# Patient Record
Sex: MALE | Race: WHITE | NOT HISPANIC OR LATINO | Employment: OTHER | URBAN - METROPOLITAN AREA
[De-identification: names, ages, dates, MRNs, and addresses within clinical notes are randomized per-mention and may not be internally consistent; named-entity substitution may affect disease eponyms.]

---

## 2024-11-15 ENCOUNTER — OFFICE VISIT (OUTPATIENT)
Dept: FAMILY MEDICINE | Facility: CLINIC | Age: 44
End: 2024-11-15
Payer: COMMERCIAL

## 2024-11-15 VITALS
DIASTOLIC BLOOD PRESSURE: 120 MMHG | HEART RATE: 74 BPM | TEMPERATURE: 98 F | WEIGHT: 204.69 LBS | BODY MASS INDEX: 32.13 KG/M2 | HEIGHT: 67 IN | SYSTOLIC BLOOD PRESSURE: 160 MMHG | OXYGEN SATURATION: 99 %

## 2024-11-15 DIAGNOSIS — R03.0 ELEVATED BLOOD PRESSURE READING: ICD-10-CM

## 2024-11-15 DIAGNOSIS — Z11.59 NEED FOR HEPATITIS C SCREENING TEST: ICD-10-CM

## 2024-11-15 DIAGNOSIS — Z00.00 NORMAL PHYSICAL EXAM: Primary | ICD-10-CM

## 2024-11-15 DIAGNOSIS — R53.83 FATIGUE, UNSPECIFIED TYPE: ICD-10-CM

## 2024-11-15 DIAGNOSIS — Z11.4 ENCOUNTER FOR SCREENING FOR HIV: ICD-10-CM

## 2024-11-15 DIAGNOSIS — R06.83 SNORING: ICD-10-CM

## 2024-11-15 PROCEDURE — 99999 PR PBB SHADOW E&M-NEW PATIENT-LVL IV: CPT | Mod: PBBFAC,,, | Performed by: INTERNAL MEDICINE

## 2024-11-15 NOTE — PROGRESS NOTES
This note was created by combination of typed  and M-Modal dictation.  Transcription errors may be present.   This note was also generated with the assistance of ambient listening technology. Verbal consent was obtained by the patient and accompanying visitor(s) for the recording of patient appointment to facilitate this note. I attest to having reviewed and edited the generated note for accuracy, though some syntax or spelling errors may persist. Please contact the author of this note for any clarification.    Assessment and Plan:   Assessment and Plan   Normal physical exam  Need for hepatitis C screening test  Encounter for screening for HIV  -return for future fasting labs   Routine HIV, hep C screening   Needs to work on cutting down salt intake   Physical activity with work  Colon cancer screening age 46  Declines vaccinations today  -     HIV 1/2 Ag/Ab (4th Gen); Future; Expected date: 11/15/2024  -     Hepatitis C Antibody; Future; Expected date: 11/15/2024  -     CBC Auto Differential; Future; Expected date: 11/15/2024  -     Comprehensive Metabolic Panel; Future; Expected date: 11/15/2024  -     Lipid Panel; Future; Expected date: 11/15/2024  -     Hemoglobin A1C; Future; Expected date: 11/15/2024  -     TSH; Future; Expected date: 11/15/2024  -     CBC Without Differential; Future; Expected date: 11/14/2025  -     Comprehensive Metabolic Panel; Future; Expected date: 11/14/2025  -     Lipid Panel; Future; Expected date: 11/14/2025  -     Hemoglobin A1C; Future; Expected date: 11/14/2025    Fatigue, unspecified type  Snoring  -reports heroic snoring, possible witnessed apnea.  He is interested in sleep study.  HST ordered.  If abnormal, would start with nasal pillow.  If normal would need in-lab study  -     Home Sleep Study; Future    Elevated blood pressure reading  -BP high on intake remains high on repeat.  Reports previously with [facility] primary care had an initial elevated BP but  reportedly normalized when he came in for BP check a few weeks later.  Has a home cuff but does not check regularly   I have asked him to start monitoring at home, discussed proper measurement techniques.  Start cutting down on salt intake.  Return 2 weeks for nurse visit for BP check.  If still significant elevated may consider initiating medication.      Assessment & Plan    PLAN SUMMARY:  - Home sleep test ordered  - Blood work ordered for follow-up visit: CBC, LFTs, kidney function, fasting glucose, thyroid function, HbA1c, lipid panel, HIV and Hepatitis C screening  - Follow-up in 2 weeks on Monday morning for blood pressure check with nurse and fasting labs  - Referred to sleep department for home sleep test setup  - Increase water consumption, minimize diet soda intake  - Reduce salt intake  - Contact sleep lab if not heard from within a week regarding sleep test setup  - Review labs results via [employer] patient portal    SLEEP APNEA:  - Assessed patient for potential sleep apnea based on reported symptoms of tiredness, restlessness, and snoring.  - Considered home sleep test as initial diagnostic approach, with in-lab study if inconclusive.  - Explained home sleep test procedure and potential need for in-lab study if inconclusive.  - Discussed CPAP mask options, including nasal pillow and full face mask.  - Home sleep test ordered.  - Referred to sleep department for home sleep test setup.  - Contact sleep lab if not heard from within a week regarding sleep test setup.    ELEVATED BLOOD PRESSURE:  - Evaluated blood pressure, noting elevated reading of 160/120; not yet diagnosing hypertension, requires 3 separate elevated readings.  - Planned to monitor blood pressure over next 2 weeks before considering medication.  - Considered impact of potential sleep apnea on blood pressure.  - Educated on criteria for hypertension diagnosis: 3 separate readings with top number above 140 and/or bottom number above 90.  -  Informed about relationship between sleep apnea and blood pressure.  - Castillo to reduce salt intake.  - Castillo to increase water consumption, minimize diet soda intake.  - Castillo to check blood pressure at home daily or every few days after sitting calmly for 5 minutes.  - Castillo to log blood pressure readings.    CANCER SCREENING:  - Assessed patient's risk for colon cancer screening, determining average risk with screening to begin at age 45.  - Discussed prostate cancer screening, noting patient is currently too young but will address in future.  - Explained colon cancer screening recommendations, including stool test at 45 and colonoscopy at 50.    GENERAL HEALTH EXAMINATION:  - Blood work ordered (to be done at follow-up visit): Complete blood count, Liver function tests, Kidney function tests, Blood sugar (fasting), Thyroid function, HbA1c, Lipid panel (fasting), HIV screening, Hepatitis C screening.    FOLLOW-UP:  - Follow up in 2 weeks on a Monday morning for blood pressure check with nurse and fasting labs.  - Review labs results via [employer] patient portal.         Medications Discontinued During This Encounter   Medication Reason    ondansetron (ZOFRAN-ODT) 8 MG TbDL Therapy completed    oxycodone-acetaminophen (PERCOCET)  mg per tablet Therapy completed       meds sent this encounter:         Follow Up:  Physical exam 1 year with labs.  Return for nurse visit for BP check.  If reinitiate BP medication would have him come back in about 3 months on new medication  Future Appointments   Date Time Provider Department Center   11/22/2024  3:00 PM [facility] [place]          Subjective:   Subjective   Chief Complaint   Patient presents with    [facility]    Annual Exam    Referral       HPI  Castillo is a 44 y.o. male.     Social History     Tobacco Use    Smoking status: Former     Types: Cigarettes     Passive exposure: Never    Smokeless tobacco: Never   Substance Use Topics    Alcohol  use: Not Currently          Social History     Social History Narrative    Not on file       Patient Care Team:  No, Primary Doctor as PCP - General    Last appointment with this clinic was Visit date not found. Last visit with me Visit date not found   To summarize last visit and events leading up to today:  New to [facility]    Today's visit:    History of Present Illness    SOCIAL HISTORY:  - Consumes diet sodas  - Occupation: Lawn care business owner, long-term career in the field    HPI:  Castillo reports sleep-related issues as his primary concern, including persistent fatigue, restlessness, and poor quality sleep at night. He reports significant snoring, and his wife has observed episodes of apnea during sleep, suggesting possible sleep apnea. These symptoms have been ongoing for an extended period.    Castillo reports occasional headaches and sinus problems related to his work in lawn care. He has severe sneezing episodes upon waking, sometimes sneezing 12-15 times consecutively. His allergy symptoms worsen during spring when pollen counts are high, and recently during a period of drought-induced dust. He takes OTC medications like Allegra and Claritin, and attempts to wear a mask while working outside to manage these symptoms.    Castillo's blood pressure was found to be elevated today, with a reading of 160/120. During his last doctor's visit years ago, his blood pressure was initially high but normalized upon retesting a week later. Castillo acknowledges a high salt intake in his diet.    Castillo denies chest pain, shortness of breath, blood in stool, heartburn, diarrhea, and constipation. He denies having any known health conditions, cardiac issues, blood sugar problems, or asthma.    MEDICATIONS:  - Allegra or Claritin, OTC, as needed for seasonal allergies    MEDICAL HISTORY:  - Seasonal allergies    FAMILY HISTORY:  - Mother: Diabetes  - Father: Heart problems, hypertension,  "hypercholesterolemia      ROS:  General: reports fatigue  Cardiovascular: no chest pain  Respiratory: no shortness of breath  Gastrointestinal: no diarrhea, no constipation, no heartburn  Neurological: reports headache  Psychiatric: reports sleep difficulty  Allergic: reports seasonal allergies         ALLERGIES AND MEDICATIONS: updated and reviewed.  Medication List with Changes/Refills   Current Medications    ONDANSETRON (ZOFRAN-ODT) 8 MG TBDL    Take 1 tablet (8 mg total) by mouth every 8 (eight) hours as needed.    OXYCODONE-ACETAMINOPHEN (PERCOCET)  MG PER TABLET    Take 1 tablet by mouth every 4 (four) hours as needed for Pain.         Objective:   Objective   Physical Exam   Vitals:    11/15/24 1459   BP: (!) 160/120   Pulse: 74   Temp: 97.5 °F (36.4 °C)   TempSrc: Oral   SpO2: 99%   Weight: 92.8 kg (204 lb 11.2 oz)   Height: 5' 7" (1.702 m)    Body mass index is 32.06 kg/m².  Weight: 92.8 kg (204 lb 11.2 oz)   Height: 5' 7" (170.2 cm)     Physical Exam  Constitutional:       Appearance: Normal appearance. He is well-developed.   HENT:      Right Ear: Tympanic membrane and external ear normal.      Left Ear: Tympanic membrane and external ear normal.      Nose: Nose normal.   Eyes:      General: No scleral icterus.     Conjunctiva/sclera: Conjunctivae normal.   Neck:      Thyroid: No thyroid mass or thyromegaly.      Trachea: Trachea normal.   Cardiovascular:      Rate and Rhythm: Normal rate and regular rhythm.      Heart sounds: Normal heart sounds, S1 normal and S2 normal. No murmur heard.  Pulmonary:      Effort: Pulmonary effort is normal.      Breath sounds: Normal breath sounds.   Abdominal:      General: There is no distension.      Palpations: Abdomen is soft. There is no hepatomegaly, splenomegaly or mass.      Tenderness: There is no abdominal tenderness.   Musculoskeletal:         General: No deformity.      Right lower leg: No edema.      Left lower leg: No edema.   Lymphadenopathy:      " Cervical: No cervical adenopathy.   Skin:     General: Skin is warm and dry.      Findings: No rash (on exposed skin).      Comments: On exposed skin   Neurological:      Mental Status: He is alert and oriented to person, place, and time.      Cranial Nerves: No cranial nerve deficit.      Sensory: No sensory deficit.      Deep Tendon Reflexes: Reflexes are normal and symmetric.   Psychiatric:         Speech: Speech normal.         Behavior: Behavior normal.         Thought Content: Thought content normal.         Judgment: Judgment normal.

## 2024-11-17 ENCOUNTER — PATIENT MESSAGE (OUTPATIENT)
Dept: FAMILY MEDICINE | Facility: CLINIC | Age: 44
End: 2024-11-17
Payer: COMMERCIAL

## 2024-11-18 NOTE — PROGRESS NOTES
This note was created by combination of typed  and M-Modal dictation.  Transcription errors may be present.   This note was also generated with the assistance of ambient listening technology. Verbal consent was obtained by the patient and accompanying visitor(s) for the recording of patient appointment to facilitate this note. I attest to having reviewed and edited the generated note for accuracy, though some syntax or spelling errors may persist. Please contact the author of this note for any clarification.    Assessment and Plan:   Assessment and Plan   Essential hypertension  -reports serial home readings persistently above 140/90.    No baseline labs yet  Start amlodipine mid-range dose.  SE profile discussed  Upcoming nurse visit for BP check and check labs  Anticipate will need additional medication - plan for irbesartan hctz  Ordered HST at  as well.  -     amLODIPine (NORVASC) 5 MG tablet; Take 1 tablet (5 mg total) by mouth once daily.  Dispense: 30 tablet; Refill: 5      There are no discontinued medications.    meds sent this encounter:  Medications Ordered This Encounter   Medications    amLODIPine (NORVASC) 5 MG tablet     Sig: Take 1 tablet (5 mg total) by mouth once daily.     Dispense:  30 tablet     Refill:  5     .         Follow Up: nurse visit for BP check and labs. Anticipate will need follow up in about 3 months on subsequent meds  Future Appointments   Date Time Provider Department Center   11/19/2024  1:00 PM Dexter Farris MD [facility] MED [place]   12/3/2024  8:30 AM NURSE, [facility] [place]   12/3/2024 10:15 AM LAB, [facility] [place]          Subjective:   Subjective   Chief Complaint   Patient presents with    Hypertension       HPI  Castillo is a 44 y.o. male.     Social History     Tobacco Use    Smoking status: Former     Types: Cigarettes     Passive exposure: Never    Smokeless tobacco: Never   Substance Use Topics    Alcohol use: Never       Social History     Occupational History    Occupation: yard and lawn care, residential and commercial      Social History     Social History Narrative    , wife, daughter       Patient Care Team:  Dexter Farris MD as PCP - General (Internal Medicine)  The chief complaint leading to consultation is: HTN  Visit type: Virtual visit with synchronous audio and video  Total time spent with patient: 15 minutes  Each patient to whom he or she provides medical services by telemedicine is:  (1) informed of the relationship between the physician and patient and the respective role of any other health care provider with respect to management of the patient; and (2) notified that he or she may decline to receive medical services by telemedicine and may withdraw from such care at any time.    Notes:  Last appointment with this clinic was 11/15/2024. Last visit with me 11/15/2024   To summarize last visit and events leading up to today:  Last visit with me 11/15/2024 to establish care  Elevated BP.  Home cuff not checking regularly.  I have asked him to start monitoring.  Return 2 weeks for BP check and if still high initiate medication.  Watch out for salt   Fatigue and snoring ordered HST    Labs ordered to be done when he returns for BP check    Today's visit:    History of Present Illness    HPI:  Castillo reports consistently elevated blood pressure readings since his last visit on Friday. He has been monitoring his blood pressure at home using an upper arm cuff. The lowest reading obtained was 158/115 or 150/115, with fluctuations ranging from 160s to 170s over 115 to 120. This morning, the patient recorded a blood pressure of 178/116. Castillo is anxious about these consistently high readings. He has been adhering to a low-sodium diet since Friday as previously discussed with me. Castillo researched symptoms online and became concerned when he read that blood pressure over 180 warrants emergency medical attention. He has  been implementing dietary changes as discussed in his previous visit.    Castillo denies chest pain, shortness of breath, or headache.    ROS:  Cardiovascular: no chest pain  Respiratory: no shortness of breath  Neurological: no headache  Psychiatric: reports anxiety         Answers submitted by the patient for this visit:  High Blood Pressure Questionnaire (Submitted on 11/18/2024)  Chief Complaint: Hypertension  Chronicity: new  Onset: in the past 7 days  Progression since onset: unchanged  Condition status: uncontrolled  anxiety: No  blurred vision: No  chest pain: No  headaches: No  malaise/fatigue: Yes  neck pain: No  orthopnea: No  palpitations: No  peripheral edema: No  PND: No  shortness of breath: No  sweats: No  Agents associated with hypertension: no associated agents  CAD risks: family history  Compliance problems: no compliance problems  Past treatments: nothing      ALLERGIES AND MEDICATIONS: updated and reviewed.        Objective:   Objective   Physical Exam   Vitals:    11/19/24 1310   BP: (!) 178/116   BP Location: Left arm   Patient Position: Sitting    There is no height or weight on file to calculate BMI.            Physical Exam  Constitutional:       General: He is not in acute distress.     Appearance: Normal appearance. He is not ill-appearing.   HENT:      Head: Normocephalic.   Pulmonary:      Effort: Pulmonary effort is normal.   Neurological:      General: No focal deficit present.      Mental Status: He is alert.   Psychiatric:         Mood and Affect: Mood normal.         Behavior: Behavior normal.         Thought Content: Thought content normal.         Judgment: Judgment normal.

## 2024-11-19 ENCOUNTER — OFFICE VISIT (OUTPATIENT)
Dept: FAMILY MEDICINE | Facility: CLINIC | Age: 44
End: 2024-11-19
Payer: COMMERCIAL

## 2024-11-19 VITALS — SYSTOLIC BLOOD PRESSURE: 178 MMHG | DIASTOLIC BLOOD PRESSURE: 116 MMHG

## 2024-11-19 DIAGNOSIS — I10 ESSENTIAL HYPERTENSION: Primary | ICD-10-CM

## 2024-11-19 PROCEDURE — 3080F DIAST BP >= 90 MM HG: CPT | Mod: CPTII,95,, | Performed by: INTERNAL MEDICINE

## 2024-11-19 PROCEDURE — 1160F RVW MEDS BY RX/DR IN RCRD: CPT | Mod: CPTII,95,, | Performed by: INTERNAL MEDICINE

## 2024-11-19 PROCEDURE — 99213 OFFICE O/P EST LOW 20 MIN: CPT | Mod: 95,,, | Performed by: INTERNAL MEDICINE

## 2024-11-19 PROCEDURE — 1159F MED LIST DOCD IN RCRD: CPT | Mod: CPTII,95,, | Performed by: INTERNAL MEDICINE

## 2024-11-19 PROCEDURE — 3077F SYST BP >= 140 MM HG: CPT | Mod: CPTII,95,, | Performed by: INTERNAL MEDICINE

## 2024-11-19 RX ORDER — AMLODIPINE BESYLATE 5 MG/1
5 TABLET ORAL DAILY
Qty: 30 TABLET | Refills: 5 | Status: SHIPPED | OUTPATIENT
Start: 2024-11-19 | End: 2025-11-19

## 2024-11-20 ENCOUNTER — PATIENT MESSAGE (OUTPATIENT)
Dept: FAMILY MEDICINE | Facility: CLINIC | Age: 44
End: 2024-11-20
Payer: COMMERCIAL

## 2024-11-21 ENCOUNTER — TELEPHONE (OUTPATIENT)
Dept: FAMILY MEDICINE | Facility: CLINIC | Age: 44
End: 2024-11-21
Payer: COMMERCIAL

## 2024-11-21 NOTE — TELEPHONE ENCOUNTER
"----- Message from Jesus sent at 11/21/2024  1:53 PM CST -----  Regarding: Wife  Type:Patient Callback     Who called: Isi "wife"     What is the request in detail: Patient received an estimate for a sleep study. The number that is on the estimate is [phone] for the [facility] location and there is no name on the estimate as well. Please reach out to the patient as soon as possible.     Can the clinic reply by [facility]? Yes     Would the patient rather a call back or a response via [facility]? Callback     Best call back number: [phone]    Additional Information:  "

## 2024-11-21 NOTE — TELEPHONE ENCOUNTER
Spoke with patient. Patient was advised to contact the sleep department to get more clarity on how home sleep studies are done. Patient verbalized understanding and does not have any other questions or concerns at this time.

## 2024-11-25 ENCOUNTER — TELEPHONE (OUTPATIENT)
Dept: PULMONOLOGY | Facility: CLINIC | Age: 44
End: 2024-11-25
Payer: COMMERCIAL

## 2024-12-03 ENCOUNTER — CLINICAL SUPPORT (OUTPATIENT)
Dept: FAMILY MEDICINE | Facility: CLINIC | Age: 44
End: 2024-12-03
Payer: COMMERCIAL

## 2024-12-03 ENCOUNTER — LAB VISIT (OUTPATIENT)
Dept: LAB | Facility: HOSPITAL | Age: 44
End: 2024-12-03
Attending: INTERNAL MEDICINE
Payer: COMMERCIAL

## 2024-12-03 ENCOUNTER — TELEPHONE (OUTPATIENT)
Dept: SLEEP MEDICINE | Facility: HOSPITAL | Age: 44
End: 2024-12-03
Payer: COMMERCIAL

## 2024-12-03 ENCOUNTER — PATIENT OUTREACH (OUTPATIENT)
Dept: ADMINISTRATIVE | Facility: HOSPITAL | Age: 44
End: 2024-12-03
Payer: COMMERCIAL

## 2024-12-03 VITALS — HEART RATE: 79 BPM | DIASTOLIC BLOOD PRESSURE: 80 MMHG | SYSTOLIC BLOOD PRESSURE: 132 MMHG | OXYGEN SATURATION: 98 %

## 2024-12-03 DIAGNOSIS — Z11.59 NEED FOR HEPATITIS C SCREENING TEST: ICD-10-CM

## 2024-12-03 DIAGNOSIS — Z00.00 NORMAL PHYSICAL EXAM: ICD-10-CM

## 2024-12-03 DIAGNOSIS — Z11.4 ENCOUNTER FOR SCREENING FOR HIV: ICD-10-CM

## 2024-12-03 DIAGNOSIS — I10 ESSENTIAL HYPERTENSION: Primary | ICD-10-CM

## 2024-12-03 LAB
ALBUMIN SERPL BCP-MCNC: 4.3 G/DL (ref 3.5–5.2)
ALP SERPL-CCNC: 64 U/L (ref 40–150)
ALT SERPL W/O P-5'-P-CCNC: 20 U/L (ref 10–44)
ANION GAP SERPL CALC-SCNC: 10 MMOL/L (ref 8–16)
AST SERPL-CCNC: 18 U/L (ref 10–40)
BASOPHILS # BLD AUTO: 0.06 K/UL (ref 0–0.2)
BASOPHILS NFR BLD: 1 % (ref 0–1.9)
BILIRUB SERPL-MCNC: 0.7 MG/DL (ref 0.1–1)
BUN SERPL-MCNC: 15 MG/DL (ref 6–20)
CALCIUM SERPL-MCNC: 8.9 MG/DL (ref 8.7–10.5)
CHLORIDE SERPL-SCNC: 105 MMOL/L (ref 95–110)
CHOLEST SERPL-MCNC: 190 MG/DL (ref 120–199)
CHOLEST/HDLC SERPL: 4.2 {RATIO} (ref 2–5)
CO2 SERPL-SCNC: 25 MMOL/L (ref 23–29)
CREAT SERPL-MCNC: 1.2 MG/DL (ref 0.5–1.4)
DIFFERENTIAL METHOD BLD: NORMAL
EOSINOPHIL # BLD AUTO: 0.3 K/UL (ref 0–0.5)
EOSINOPHIL NFR BLD: 4 % (ref 0–8)
ERYTHROCYTE [DISTWIDTH] IN BLOOD BY AUTOMATED COUNT: 12.2 % (ref 11.5–14.5)
EST. GFR  (NO RACE VARIABLE): >60 ML/MIN/1.73 M^2
ESTIMATED AVG GLUCOSE: 108 MG/DL (ref 68–131)
GLUCOSE SERPL-MCNC: 81 MG/DL (ref 70–110)
HBA1C MFR BLD: 5.4 % (ref 4–5.6)
HCT VFR BLD AUTO: 46.4 % (ref 40–54)
HCV AB SERPL QL IA: NORMAL
HDLC SERPL-MCNC: 45 MG/DL (ref 40–75)
HDLC SERPL: 23.7 % (ref 20–50)
HGB BLD-MCNC: 15.2 G/DL (ref 14–18)
HIV 1+2 AB+HIV1 P24 AG SERPL QL IA: NORMAL
IMM GRANULOCYTES # BLD AUTO: 0.02 K/UL (ref 0–0.04)
IMM GRANULOCYTES NFR BLD AUTO: 0.3 % (ref 0–0.5)
LDLC SERPL CALC-MCNC: 125.6 MG/DL (ref 63–159)
LYMPHOCYTES # BLD AUTO: 1.8 K/UL (ref 1–4.8)
LYMPHOCYTES NFR BLD: 28.6 % (ref 18–48)
MCH RBC QN AUTO: 30.7 PG (ref 27–31)
MCHC RBC AUTO-ENTMCNC: 32.8 G/DL (ref 32–36)
MCV RBC AUTO: 94 FL (ref 82–98)
MONOCYTES # BLD AUTO: 0.6 K/UL (ref 0.3–1)
MONOCYTES NFR BLD: 10 % (ref 4–15)
NEUTROPHILS # BLD AUTO: 3.5 K/UL (ref 1.8–7.7)
NEUTROPHILS NFR BLD: 56.1 % (ref 38–73)
NONHDLC SERPL-MCNC: 145 MG/DL
NRBC BLD-RTO: 0 /100 WBC
PLATELET # BLD AUTO: 276 K/UL (ref 150–450)
PMV BLD AUTO: 11.2 FL (ref 9.2–12.9)
POTASSIUM SERPL-SCNC: 4.3 MMOL/L (ref 3.5–5.1)
PROT SERPL-MCNC: 7.4 G/DL (ref 6–8.4)
RBC # BLD AUTO: 4.95 M/UL (ref 4.6–6.2)
SODIUM SERPL-SCNC: 140 MMOL/L (ref 136–145)
TRIGL SERPL-MCNC: 97 MG/DL (ref 30–150)
TSH SERPL DL<=0.005 MIU/L-ACNC: 1.95 UIU/ML (ref 0.4–4)
WBC # BLD AUTO: 6.3 K/UL (ref 3.9–12.7)

## 2024-12-03 PROCEDURE — 99999 PR PBB SHADOW E&M-EST. PATIENT-LVL II: CPT | Mod: PBBFAC,,,

## 2024-12-03 PROCEDURE — 85025 COMPLETE CBC W/AUTO DIFF WBC: CPT | Performed by: INTERNAL MEDICINE

## 2024-12-03 PROCEDURE — 80061 LIPID PANEL: CPT | Performed by: INTERNAL MEDICINE

## 2024-12-03 PROCEDURE — 87389 HIV-1 AG W/HIV-1&-2 AB AG IA: CPT | Performed by: INTERNAL MEDICINE

## 2024-12-03 PROCEDURE — 80053 COMPREHEN METABOLIC PANEL: CPT | Performed by: INTERNAL MEDICINE

## 2024-12-03 PROCEDURE — 86803 HEPATITIS C AB TEST: CPT | Performed by: INTERNAL MEDICINE

## 2024-12-03 PROCEDURE — 83036 HEMOGLOBIN GLYCOSYLATED A1C: CPT | Performed by: INTERNAL MEDICINE

## 2024-12-03 PROCEDURE — 36415 COLL VENOUS BLD VENIPUNCTURE: CPT | Mod: PO | Performed by: INTERNAL MEDICINE

## 2024-12-03 PROCEDURE — 84443 ASSAY THYROID STIM HORMONE: CPT | Performed by: INTERNAL MEDICINE

## 2024-12-03 NOTE — PROGRESS NOTES
"Castillo Domínguez 44 y.o. male is here today for Blood Pressure check.   History of HTN yes.    Review of patient's allergies indicates:  No Known Allergies  No results found for: "CREATININE", "NA", "K"]  Patient verifies taking blood pressure medications on a regular basis at the same time of the day.     Current Outpatient Medications:     amLODIPine (NORVASC) 5 MG tablet, Take 1 tablet (5 mg total) by mouth once daily., Disp: 30 tablet, Rfl: 5  Does patient have record of home blood pressure readings no.   Last dose of blood pressure medication was taken at 7 am 12/2/24.  Patient is asymptomatic.       BP: 132/80 , Pulse: 79 .        "
Name band;

## 2024-12-04 NOTE — PROGRESS NOTES
Screening HIV, hep C negative   TSH normal   Lipid normal   CMP normal  A1c 5.4 normal   CBC normal    BP significantly improved had nurse visit 12/03/2024.  On amlodipine  Results to pt.  Plan for OV 6 months.

## 2024-12-13 ENCOUNTER — PATIENT MESSAGE (OUTPATIENT)
Dept: FAMILY MEDICINE | Facility: CLINIC | Age: 44
End: 2024-12-13
Payer: COMMERCIAL

## 2024-12-29 ENCOUNTER — PATIENT MESSAGE (OUTPATIENT)
Dept: FAMILY MEDICINE | Facility: CLINIC | Age: 44
End: 2024-12-29
Payer: COMMERCIAL

## 2024-12-29 DIAGNOSIS — I10 ESSENTIAL HYPERTENSION: Primary | ICD-10-CM

## 2024-12-30 RX ORDER — AMLODIPINE BESYLATE 10 MG/1
10 TABLET ORAL DAILY
Qty: 90 TABLET | Refills: 3 | Status: SHIPPED | OUTPATIENT
Start: 2024-12-30 | End: 2025-12-30